# Patient Record
Sex: FEMALE | Race: WHITE
[De-identification: names, ages, dates, MRNs, and addresses within clinical notes are randomized per-mention and may not be internally consistent; named-entity substitution may affect disease eponyms.]

---

## 2018-06-16 ENCOUNTER — HOSPITAL ENCOUNTER (EMERGENCY)
Dept: HOSPITAL 80 - FED | Age: 63
Discharge: HOME | End: 2018-06-16
Payer: COMMERCIAL

## 2018-06-16 VITALS — DIASTOLIC BLOOD PRESSURE: 87 MMHG | SYSTOLIC BLOOD PRESSURE: 145 MMHG

## 2018-06-16 DIAGNOSIS — S61.211A: Primary | ICD-10-CM
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
## 2018-06-16 DIAGNOSIS — Z23: ICD-10-CM

## 2018-06-16 DIAGNOSIS — S61.112A: ICD-10-CM
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
## 2018-06-16 PROCEDURE — 0HQGXZZ REPAIR LEFT HAND SKIN, EXTERNAL APPROACH: ICD-10-PCS

## 2018-06-16 NOTE — EDPHY
H & P


Stated Complaint: Lacs L finger and thumb


Source: Patient


Exam Limitations: No limitations





- Personal History


Current Tetanus/Diphtheria Vaccine: No


Current Tetanus Diphtheria and Acellular Pertussis (TDAP): No





- Medical/Surgical History


Hx Asthma: No


Hx Chronic Respiratory Disease: No


Hx Diabetes: No


Hx Cardiac Disease: No


Hx Renal Disease: No


Hx Cirrhosis: No


Hx Alcoholism: No


Hx HIV/AIDS: No


Hx Splenectomy or Spleen Trauma: No


Other PMH: essential tremor.  denies





- Social History


Smoking Status: Never smoked


Time Seen by Provider: 06/16/18 14:01


HPI/ROS: 


HPI:  This is a 63-year-old female who presents with





Chief Complaint:  Lacerations to left finger and thumb





Location:  Left index finger and thumb


Quality:  Laceration


Duration:  Prior to arrival


Signs and Symptoms: + bleeding, no radiation, no numbness, no weakness, no 

tingling, no incontinence, no decreased range of motion, no swelling, no pain, 

no fever


Timing:  Acute


Severity:  Mild


Context:  Patient is right-hand dominant, presents with accidentally cutting 

her left thumb at the tip where the nail is as well as her left index finger 

with a  while cutting fabric prior to arrival.  She reports that 

there was minimal bleeding and hardly any pain.  The concern is whether she 

needs stitches or not. Tetanus status unknown.  Denies radiation, weakness, 

decreased range of motion.  Does Not take any blood thinners.


Modifying Factors:  She applied direct pressure and bleeding stopped





Comment: 








ROS: see HPI


Constitutional: No fever, no chills, no weight loss


Eyes:  No blurred vision


Respiratory:  No shortness of breath, no cough


Cardiovascular:  No chest pain


Gastrointestinal:  No nausea, no vomiting no diarrhea 


Genitourinary:  No dysuria 


Extremities:  No myalgias 


Neurologic:  No weakness, no numbness


Skin:  No rashes


Hematologic:  No bruising, no bleeding








MEDICAL/SURGICAL/SOCIAL HISTORY: 


Medical history:  Essential tremor


Surgical history:  Denies


Social history:  Never smoked.








CONSTITUTIONAL:  Extremely polite and cooperative elderly white female, awake 

and alert, no obvious distress


HEENT: Atraumatic and normocephalic.


EXTREMITIES:  2/2 pulses, strength 5/5, left thumb shows nail injury at the 

distal tip and v-shaped formation still intact.  Left index finger shows 

superficial 1/8 cm, linear, simple laceration. DIP/PIP/MCP flexion/extension 

intact with good light touch sensation. no deformities, no clubbing, no 

cyanosis or edema.


NEUROLOGICAL: no focal neuro deficits.  GCS 15.  Light touch sensation intact.


SKIN: Warm and dry, no erythema. no rash.  Good capillary refill. 


 (Cherelle Kelley)


Constitutional: 





 Initial Vital Signs











Temperature (C)  37.0 C   06/16/18 13:46


 


Heart Rate  69   06/16/18 13:46


 


Respiratory Rate  16   06/16/18 13:46


 


Blood Pressure  141/76 H  06/16/18 13:46


 


O2 Sat (%)  97   06/16/18 13:46








 











O2 Delivery Mode               Room Air














Allergies/Adverse Reactions: 


 





amoxicillin Allergy (Verified 06/16/18 13:50)


 








Home Medications: 














 Medication  Instructions  Recorded


 


Estrogens, Conjugated  06/16/18


 


Propranolol HCl  06/16/18














Medical Decision Making


Procedures: 


Procedure:  Laceration repair.





Verbal consent was obtained from the patient.  The 1/4 cm, simple, linear, 

superficial laceration on the left index finger was anesthetized in the usual 

fashion using 3 mL of 1% lidocaine without epinephrine.  The wound was irrigated

, draped and explored to its base with a gloved finger.  There were no deep 

structures involved.  No tendon injury was identified.  The wound was repaired 

with #5, 5 0 Prolene in simple interrupted pattern.  Good hemostasis achieved 

and patient tolerated procedure well. The procedure was performed by myself.





Procedure:  Laceration repair.





Verbal consent was obtained from the patient.  The left thumb, superficial, 1/4 

cm laceration involving the nail was anesthetized in the usual fashion using 3 

mL of 1% lidocaine without epinephrine.  The wound was irrigated, draped and 

explored to its base with a gloved finger.  There were no deep structures 

involved.  No tendon injury was identified.  The wound was repaired with 

Dermabond.  Good hemostasis was achieved and patient tolerated procedure well.  

The procedure was performed by myself.


 (Cherelle Kelley)


ED Course/Re-evaluation: 


Tetanus booster ordered


Copiously irrigated


Lacerations closed with Dermabond


No signs of neurovascular compromise/tenting of skin/compartment syndrome/

extremities and joints examined above and below area of concern and are 

neurovascularly intact.








This patient was seen under the supervision of my secondary supervising 

physician.  I evaluated care for this patient independently.  Discussed this 

patient with Dr. Sanchez who did not see the patient.   


 (Cherelle Kelley)


Differential Diagnosis: 


Differential diagnosis includes but is not limited to laceration, nerve injury, 

tendon injury, foreign body.


 (Cherelle Kelley)


Other Provider: 





The patient was evaluated and managed by the Physician Assistant.  My co-

signature indicates that I have reviewed this chart and I agree with the 

findings and plan of care as documented.  I am the secondary supervising 

physician. (Digna Sanchez)





- Data Points


Medications Given: 





 








Discontinued Medications





Diphtheria/Tetanus/Acell Pertussis (Boostrix)  0.5 ml IM .ONCE ONE


   Stop: 06/16/18 14:02


   Last Admin: 06/16/18 14:15 Dose:  0.5 ml


Tetracaine/Epinephrine/Lidocaine (Let Gel Topical)  1 ea TP EDNOW ONE


   Stop: 06/16/18 14:02


   Last Admin: 06/16/18 14:19 Dose:  Not Given








Departure





- Departure


Disposition: Home, Routine, Self-Care


Clinical Impression: 


 Laceration of left thumb with damage to nail, Laceration of left index finger w

/o foreign body w/o damage to nail





Condition: Good


Instructions:  Care For Your Stitches (ED), Finger Laceration (ED), Skin 

Adhesive Care (ED)


Additional Instructions: 


Keep the dressing dry and in place for 48 hours.


After 48 hours, you may remove the dressing; wash the site daily with mild soap 

and water; then pat dry.


Allow the skin glue to slowly dissolve on its own.  


Take Tylenol 650 mg every 4 hours and/or Ibuprofen 600 mg every 8 hours with 

food as needed for pain. 








Wound Care Follow-Up:


Removal of sutures in [ 10 ] days.  Suture removal is complimentary in 

uncomplicated cases.  


Infection or abnormal findings would require reevaluation by the MD.  In that 

case, you may be billed.  








Return to the ER immediately if you experience redness, red streaks, have fevers

/chills, flu like symptoms, limited range of motion, or any other symptoms that 

concern you.





Referrals: 


JARETT SAEZ [Other] - Follow Up Only If Needed